# Patient Record
Sex: MALE | Race: AMERICAN INDIAN OR ALASKA NATIVE
[De-identification: names, ages, dates, MRNs, and addresses within clinical notes are randomized per-mention and may not be internally consistent; named-entity substitution may affect disease eponyms.]

---

## 2019-09-30 ENCOUNTER — HOSPITAL ENCOUNTER (EMERGENCY)
Dept: HOSPITAL 5 - ED | Age: 29
LOS: 1 days | Discharge: HOME | End: 2019-10-01
Payer: COMMERCIAL

## 2019-09-30 DIAGNOSIS — M54.5: ICD-10-CM

## 2019-09-30 DIAGNOSIS — J45.909: ICD-10-CM

## 2019-09-30 DIAGNOSIS — M79.10: ICD-10-CM

## 2019-09-30 DIAGNOSIS — F17.200: ICD-10-CM
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
## 2019-09-30 DIAGNOSIS — Z79.899: ICD-10-CM
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
## 2019-09-30 DIAGNOSIS — M54.2: Primary | ICD-10-CM
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
## 2019-09-30 DIAGNOSIS — R07.89: ICD-10-CM

## 2019-09-30 PROCEDURE — 71046 X-RAY EXAM CHEST 2 VIEWS: CPT

## 2019-09-30 PROCEDURE — 99283 EMERGENCY DEPT VISIT LOW MDM: CPT

## 2019-10-01 VITALS — SYSTOLIC BLOOD PRESSURE: 116 MMHG | DIASTOLIC BLOOD PRESSURE: 72 MMHG
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
